# Patient Record
Sex: FEMALE | Race: WHITE
[De-identification: names, ages, dates, MRNs, and addresses within clinical notes are randomized per-mention and may not be internally consistent; named-entity substitution may affect disease eponyms.]

---

## 2019-03-01 ENCOUNTER — HOSPITAL ENCOUNTER (OUTPATIENT)
Dept: HOSPITAL 75 - RAD | Age: 44
End: 2019-03-01
Attending: OBSTETRICS & GYNECOLOGY
Payer: COMMERCIAL

## 2019-03-01 DIAGNOSIS — Z12.31: Primary | ICD-10-CM

## 2019-03-01 PROCEDURE — 77067 SCR MAMMO BI INCL CAD: CPT

## 2019-03-01 NOTE — DIAGNOSTIC IMAGING REPORT
Digital mammogram bilateral screening with 3D tomosynthesis.



The current study was also evaluated with a Computer Aided

Detection (CAD) system.



INDICATION: Screening.



This study was compared to the prior exams of 09/13/2016 and

08/20/2015. At this time, there are no current complaints.



FINDINGS: The fibroglandular tissue in both breasts is

heterogeneously dense. This does limit the sensitivity of this

exam. Overall, there does not appear to have been any significant

change when compared to the prior study. No primary or secondary

sign of malignancy is noted. 3D tomographic images fail to show

any sign of malignancy. 



IMPRESSION: There is no radiographic evidence for malignancy.



ACR BI-RADS Category 1: Negative.

Result letter will be mailed to the patient.

Note:  At least 10% of breast cancer is not imaged by

mammography.



Dictated by: 



  Dictated on workstation # [id number]

## 2019-03-18 ENCOUNTER — HOSPITAL ENCOUNTER (OUTPATIENT)
Dept: HOSPITAL 75 - RAD | Age: 44
End: 2019-03-18
Attending: OBSTETRICS & GYNECOLOGY
Payer: COMMERCIAL

## 2019-03-18 DIAGNOSIS — N92.6: ICD-10-CM

## 2019-03-18 DIAGNOSIS — N94.5: Primary | ICD-10-CM

## 2019-03-18 PROCEDURE — 76856 US EXAM PELVIC COMPLETE: CPT

## 2019-03-18 PROCEDURE — 76830 TRANSVAGINAL US NON-OB: CPT

## 2019-03-25 NOTE — DIAGNOSTIC IMAGING REPORT
PROCEDURE: US Non-ob pelvis comp/trans.



TECHNIQUE:  Multiple realtime grayscale images were obtained of

the pelvis in various projections endovaginally.



Transabdominal imaging was also performed.



INDICATION: Irregular bleeding.



FINDINGS: Uterus measures 7.3 x 5.3 x 4.6 cm. Endometrium is 3 mm

in thickness. No myometrial mass is identified. Right ovary

measures 2.3 x 1.5 x 1.2 cm and the left ovary measures 2.7 x 2.6

x 1.1 cm. No adnexal mass or free fluid is seen. Flow is detected

in the right ovary. No flow could be detected in the left ovary,

as the left ovary is only visualized transabdominally.



IMPRESSION: Essentially unremarkable pelvic ultrasound.



Dictated by: 



  Dictated on workstation # [id number]

## 2020-10-13 ENCOUNTER — HOSPITAL ENCOUNTER (OUTPATIENT)
Dept: HOSPITAL 75 - RAD | Age: 45
End: 2020-10-13
Attending: OBSTETRICS & GYNECOLOGY
Payer: COMMERCIAL

## 2020-10-13 DIAGNOSIS — Z12.31: Primary | ICD-10-CM

## 2020-10-13 PROCEDURE — 77063 BREAST TOMOSYNTHESIS BI: CPT

## 2020-10-13 PROCEDURE — 77067 SCR MAMMO BI INCL CAD: CPT

## 2020-10-13 NOTE — DIAGNOSTIC IMAGING REPORT
INDICATION: 

Routine screening.



COMPARISON:   

03/01/2019 and 09/13/2016.



TECHNIQUE: 

2D and 3D bilateral screening mammography was performed with CAD.



FINDINGS:

Both breasts are heterogeneously dense, limiting the sensitivity

of mammography. The parenchymal pattern is stable. No mass or

malignant appearing microcalcifications are seen. The axillae are

unremarkable.



IMPRESSION: 

No mammographic features suspicious for malignancy are

identified.



ACR BI-RADS Category 1: Negative.

Result letter will be mailed to the patient.

Note:  At least 10% of breast cancer is not imaged by

mammography.



Dictated by: 



  Dictated on workstation # [id number]

## 2021-11-02 ENCOUNTER — HOSPITAL ENCOUNTER (OUTPATIENT)
Dept: HOSPITAL 75 - RAD | Age: 46
End: 2021-11-02
Attending: OBSTETRICS & GYNECOLOGY
Payer: COMMERCIAL

## 2021-11-02 DIAGNOSIS — Z12.31: Primary | ICD-10-CM

## 2021-11-02 PROCEDURE — 77067 SCR MAMMO BI INCL CAD: CPT

## 2021-11-02 PROCEDURE — 77063 BREAST TOMOSYNTHESIS BI: CPT

## 2021-11-02 NOTE — DIAGNOSTIC IMAGING REPORT
INDICATION: 

Routine screening.



COMPARISON:   

10/13/2020 and 03/01/2019.



TECHNIQUE: 

2D and 3D bilateral screening mammography was performed with CAD.



FINDINGS:

Both breasts are heterogeneously dense, limiting the sensitivity

of mammography. The parenchymal pattern is stable. No mass or

malignant-appearing microcalcifications are seen. The axillae are

unremarkable.



IMPRESSION: 

No mammographic features suspicious for malignancy are

identified.



ACR BI-RADS Category 1: Negative.

Result letter will be mailed to the patient.

Note:  At least 10% of breast cancer is not imaged by

mammography.



Dictated by: 



  Dictated on workstation # [id number]

## 2022-11-21 ENCOUNTER — HOSPITAL ENCOUNTER (OUTPATIENT)
Dept: HOSPITAL 75 - RAD | Age: 47
End: 2022-11-21
Attending: OBSTETRICS & GYNECOLOGY
Payer: COMMERCIAL

## 2022-11-21 DIAGNOSIS — Z12.31: Primary | ICD-10-CM

## 2022-11-21 PROCEDURE — 77063 BREAST TOMOSYNTHESIS BI: CPT

## 2022-11-21 PROCEDURE — 77067 SCR MAMMO BI INCL CAD: CPT

## 2022-11-22 NOTE — DIAGNOSTIC IMAGING REPORT
INDICATION: 

Routine screening.



COMPARISON: 

11/02/2021 and 10/13/2020.



TECHNIQUE: 

2D and 3D bilateral screening mammography was performed with CAD.



FINDINGS:

Both breasts are heterogeneously dense, limiting the sensitivity

of mammography. The parenchymal pattern is stable. No mass or

malignant-appearing microcalcifications are seen. The axillae are

unremarkable.



IMPRESSION: 

No mammographic features suspicious for malignancy are

identified.



ACR BI-RADS Category 1: Negative.

Result letter will be mailed to the patient.

Note:  At least 10% of breast cancer is not imaged by

mammography.



Dictated by: 



  Dictated on workstation # [id number]